# Patient Record
Sex: FEMALE | Race: WHITE | NOT HISPANIC OR LATINO | Employment: FULL TIME | URBAN - METROPOLITAN AREA
[De-identification: names, ages, dates, MRNs, and addresses within clinical notes are randomized per-mention and may not be internally consistent; named-entity substitution may affect disease eponyms.]

---

## 2019-03-06 ENCOUNTER — OFFICE VISIT (OUTPATIENT)
Dept: FAMILY MEDICINE CLINIC | Facility: CLINIC | Age: 39
End: 2019-03-06
Payer: COMMERCIAL

## 2019-03-06 VITALS
DIASTOLIC BLOOD PRESSURE: 70 MMHG | HEIGHT: 67 IN | RESPIRATION RATE: 18 BRPM | HEART RATE: 86 BPM | TEMPERATURE: 97.5 F | WEIGHT: 178.8 LBS | BODY MASS INDEX: 28.06 KG/M2 | SYSTOLIC BLOOD PRESSURE: 100 MMHG

## 2019-03-06 DIAGNOSIS — J98.01 BRONCHOSPASM: Primary | ICD-10-CM

## 2019-03-06 DIAGNOSIS — R05.9 COUGH: ICD-10-CM

## 2019-03-06 DIAGNOSIS — J40 BRONCHITIS: ICD-10-CM

## 2019-03-06 PROCEDURE — 99213 OFFICE O/P EST LOW 20 MIN: CPT | Performed by: NURSE PRACTITIONER

## 2019-03-06 PROCEDURE — 94640 AIRWAY INHALATION TREATMENT: CPT | Performed by: NURSE PRACTITIONER

## 2019-03-06 RX ORDER — AZITHROMYCIN 250 MG/1
TABLET, FILM COATED ORAL
Qty: 6 TABLET | Refills: 0 | Status: SHIPPED | OUTPATIENT
Start: 2019-03-06 | End: 2019-03-10

## 2019-03-06 RX ORDER — ALBUTEROL SULFATE 90 UG/1
2 AEROSOL, METERED RESPIRATORY (INHALATION) EVERY 4 HOURS PRN
Qty: 1 INHALER | Refills: 5 | Status: SHIPPED | OUTPATIENT
Start: 2019-03-06 | End: 2020-12-09

## 2019-03-06 RX ORDER — PREDNISONE 20 MG/1
40 TABLET ORAL DAILY
Qty: 10 TABLET | Refills: 0 | Status: SHIPPED | OUTPATIENT
Start: 2019-03-06 | End: 2020-11-27

## 2019-03-06 NOTE — PROGRESS NOTES
Assessment/Plan:    No problem-specific Assessment & Plan notes found for this encounter  Diagnoses and all orders for this visit:    Bronchospasm  -     albuterol (PROVENTIL HFA,VENTOLIN HFA) 90 mcg/act inhaler; Inhale 2 puffs every 4 (four) hours as needed for wheezing or shortness of breath  -     predniSONE 20 mg tablet; Take 2 tablets (40 mg total) by mouth daily    Bronchitis  -     azithromycin (ZITHROMAX) 250 mg tablet; Take 2 tablets today then 1 tablet daily x 4 days    Cough  Cont with otc cough med  Encourage fluids  Other orders  -     Mini neb          Subjective:      Patient ID: Sedrick Kumar is a 45 y o  female  HPI    Patient presents today for upper respiratory symptoms and fevers and chills which started about 10 days ago  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  Her children were diagnosed with the flu last week  Sounds as if she probably had similar  Right now she is with no fevers but she is proceeding to continue to cough  She is positive for wheezing  Activity causes her to cough more  She is tired run down  Feeling facial pressure    The following portions of the patient's history were reviewed and updated as appropriate: allergies, current medications, past family history, past medical history, past social history, past surgical history and problem list     Review of Systems   Constitutional: Positive for activity change, appetite change, chills and fatigue  Negative for fever  HENT: Negative for congestion, rhinorrhea, sneezing and sore throat  Respiratory: Positive for cough, chest tightness and wheezing  Cardiovascular: Negative for chest pain  Gastrointestinal: Negative for abdominal pain, constipation and diarrhea  Skin: Negative for rash  Neurological: Negative for dizziness, weakness, light-headedness, numbness and headaches  Psychiatric/Behavioral: The patient is not nervous/anxious            Objective:  Vitals:    03/06/19 1307 BP: 100/70   BP Location: Left arm   Patient Position: Sitting   Cuff Size: Adult   Pulse: 86   Resp: 18   Temp: 97 5 °F (36 4 °C)   TempSrc: Temporal   Weight: 81 1 kg (178 lb 12 8 oz)   Height: 5' 6 54" (1 69 m)      Physical Exam   Constitutional: She appears well-developed and well-nourished  HENT:   Right Ear: External ear normal    Left Ear: External ear normal    Nose: Nose normal    Mouth/Throat: Oropharynx is clear and moist    Eyes: Conjunctivae and EOM are normal    Neck: Normal range of motion  Neck supple  Cardiovascular: Normal rate, regular rhythm and normal heart sounds  Pulmonary/Chest: Effort normal and breath sounds normal    Cough with deep breath   Skin: Skin is warm and dry  Psychiatric: She has a normal mood and affect  Her behavior is normal    Vitals reviewed      Mini neb  Performed by: LAKE Koch  Authorized by: LAKE Koch     Number of treatments:  1  Treatment 1:   Pre-Procedure     Symptoms:  Cough    Lung Sounds:  Clear    HR:  86    RR:  18    Medication Administered:  Albuterol 2 5 mg  Post-Procedure     Symptoms:  Cough    Lung sounds:  Clear with slightly improved breath sounds

## 2019-09-07 ENCOUNTER — OFFICE VISIT (OUTPATIENT)
Dept: URGENT CARE | Age: 39
End: 2019-09-07
Payer: COMMERCIAL

## 2019-09-07 VITALS
SYSTOLIC BLOOD PRESSURE: 100 MMHG | OXYGEN SATURATION: 99 % | HEIGHT: 66 IN | HEART RATE: 81 BPM | TEMPERATURE: 97.9 F | BODY MASS INDEX: 31.02 KG/M2 | DIASTOLIC BLOOD PRESSURE: 65 MMHG | WEIGHT: 193 LBS

## 2019-09-07 DIAGNOSIS — D49.2 SKIN GROWTH: Primary | ICD-10-CM

## 2019-09-07 PROCEDURE — 99213 OFFICE O/P EST LOW 20 MIN: CPT | Performed by: PHYSICIAN ASSISTANT

## 2019-09-07 NOTE — PROGRESS NOTES
[facility]        NAME: Solo Leggett is a 44 y o  female  : 1980    MRN: [id number]  DATE: 2019  TIME: 12:31 PM    Assessment and Plan   Skin growth [D49 2]  1  Skin growth  Ambulatory referral to Dermatology    Ambulatory referral to General Surgery         Patient Instructions     Patient declines ER transfer at this time  She would like to follow up with PCP- will follow-up in the next 1-2 days for reexamination, further evaluation and treatment  Dermatology referral and General Surgery referral was placed, please call Dermatology and Gen Surgery to schedule an appointment in the next few days  Keep covered to prevent irritation/further abrasions or catching on clothing    Go to the nearest ER for evaluation if any fevers, redness, warmth, discharge, bleeding, pain, change in size, numbness, tingling, weakness, unable to stay hydrated, abdominal pain, chest pain, shortness of breath, new or worsening symptoms or other concerning symptoms  Chief Complaint     Chief Complaint   Patient presents with    skin tag     Pt states her skin tag located in middle gluteus was starting bleeding 3 days ago         History of Present Illness       27-year-old female presents with skin tag to the middle of her sacral area for the past few months  She states her family doctor sought a few more months ago and stated it was a skin tag  She states that has slowly been getting larger and she states 3 days ago she thinks she accidentally cut it on something/the growth got caught on something and it started to bleed  Patient states she has been trying to put paper towels/now begins to prevent the scratching/irritation on her clothes  Patient states she presented today hoping to get it removed  She states it is slightly bother some as it rubs against her clothing and when she sits  Denies any injury or trauma    Denies any fevers, drainage, redness, warmth, numbness, weakness, bowel/bladder dysfunction, GI/ symptoms, chest pain, shortness of breath or other complaints  Review of Systems   Review of Systems   Constitutional: Negative for chills, fatigue and fever  HENT: Negative for facial swelling, sore throat, trouble swallowing and voice change  Respiratory: Negative for cough and shortness of breath  Cardiovascular: Negative for chest pain  Gastrointestinal: Negative for abdominal pain, constipation, diarrhea, nausea, rectal pain and vomiting  Musculoskeletal: Negative for arthralgias, back pain, joint swelling, myalgias and neck pain  Skin: Positive for wound  Neurological: Negative for dizziness, weakness, numbness and headaches  All other systems reviewed and are negative  Current Medications       Current Outpatient Medications:     albuterol (PROVENTIL HFA,VENTOLIN HFA) 90 mcg/act inhaler, Inhale 2 puffs every 4 (four) hours as needed for wheezing or shortness of breath, Disp: 1 Inhaler, Rfl: 5    predniSONE 20 mg tablet, Take 2 tablets (40 mg total) by mouth daily (Patient not taking: Reported on 9/7/2019), Disp: 10 tablet, Rfl: 0    Current Allergies     Allergies as of 09/07/2019 - Reviewed 09/07/2019   Allergen Reaction Noted    Acetaminophen-codeine  11/03/2015    Ibuprofen  11/03/2015            The following portions of the patient's history were reviewed and updated as appropriate: allergies, current medications, past family history, past medical history, past social history, past surgical history and problem list      Past Medical History:   Diagnosis Date    Anemia 11/3/2015       No past surgical history on file  Family History   Problem Relation Age of Onset    Thyroid disease Mother     Hypertension Father     Diabetes Father          Medications have been verified          Objective   /65   Pulse 81   Temp 97 9 °F (36 6 °C)   Ht 5' 6" (1 676 m)   Wt 87 5 kg (193 lb)   LMP 08/15/2019   SpO2 99%   BMI 31 15 kg/m² Physical Exam     Physical Exam   Constitutional: She is oriented to person, place, and time  She appears well-developed and well-nourished  No distress  Nontoxic-appearing   HENT:   Mouth/Throat: Oropharynx is clear and moist    Cardiovascular: Normal rate, regular rhythm and normal heart sounds  Pulmonary/Chest: Effort normal and breath sounds normal  No respiratory distress  She has no wheezes  Neurological: She is alert and oriented to person, place, and time  She has normal reflexes  Skin: Skin is warm and dry  Rash (small linear abrasion on the skin colored 1cm circular lesion attached by a skin colored thin piece of skin coming laterally off of the skin superiolaterally from the celestino cleft) noted  No current bleeding or discharge  No sign of cyst or abscess  No surrounding erythema, warmth, streaking redness or sign of infection/cellulitis  NTTP   Psychiatric: She has a normal mood and affect  Her behavior is normal    Nursing note and vitals reviewed

## 2019-09-07 NOTE — PATIENT INSTRUCTIONS
Patient declines ER transfer at this time  She would like to follow up with PCP- will follow-up in the next 1-2 days for reexamination, further evaluation and treatment  Dermatology referral and General Surgery referral was placed, please call Dermatology and Gen Surgery to schedule an appointment in the next few days  Keep covered to prevent irritation/further abrasions or catching on clothing    Go to the nearest ER for evaluation if any fevers, redness, warmth, discharge, bleeding, pain, change in size, numbness, tingling, weakness, unable to stay hydrated, abdominal pain, chest pain, shortness of breath, new or worsening symptoms or other concerning symptoms

## 2019-09-10 RX ORDER — VALACYCLOVIR HYDROCHLORIDE 1 G/1
TABLET, FILM COATED ORAL
COMMUNITY
Start: 2017-09-05 | End: 2020-04-21 | Stop reason: SDUPTHER

## 2019-09-10 RX ORDER — ACYCLOVIR 50 MG/G
OINTMENT TOPICAL
COMMUNITY
Start: 2017-09-05 | End: 2020-04-21 | Stop reason: SDUPTHER

## 2019-09-11 ENCOUNTER — OFFICE VISIT (OUTPATIENT)
Dept: FAMILY MEDICINE CLINIC | Facility: CLINIC | Age: 39
End: 2019-09-11
Payer: COMMERCIAL

## 2019-09-11 VITALS
OXYGEN SATURATION: 98 % | BODY MASS INDEX: 30.79 KG/M2 | TEMPERATURE: 97.6 F | RESPIRATION RATE: 18 BRPM | HEART RATE: 91 BPM | DIASTOLIC BLOOD PRESSURE: 60 MMHG | SYSTOLIC BLOOD PRESSURE: 100 MMHG | WEIGHT: 191.6 LBS | HEIGHT: 66 IN

## 2019-09-11 DIAGNOSIS — L98.9 SKIN LESION OF BACK: Primary | ICD-10-CM

## 2019-09-11 PROCEDURE — 88304 TISSUE EXAM BY PATHOLOGIST: CPT | Performed by: PATHOLOGY

## 2019-09-11 PROCEDURE — 99213 OFFICE O/P EST LOW 20 MIN: CPT | Performed by: NURSE PRACTITIONER

## 2019-09-11 PROCEDURE — 3008F BODY MASS INDEX DOCD: CPT | Performed by: NURSE PRACTITIONER

## 2019-09-11 NOTE — PROGRESS NOTES
Subjective:   Chief Complaint   Patient presents with    Skin Problem     lower back         Patient ID: Valerie Brooks is a 44 y o  female  Presents today for removal of skin tag on her lower back  She thinks she scratched it or pulled it somehow in it has begun to bleed so she would like removed      The following portions of the patient's history were reviewed and updated as appropriate: allergies, current medications, past family history, past medical history, past social history, past surgical history and problem list     Review of Systems   Constitutional: Negative for chills, fatigue and fever  Respiratory: Negative for cough, shortness of breath and wheezing  Cardiovascular: Negative for chest pain and palpitations  Skin: Positive for wound (skin tag on lower back)  Psychiatric/Behavioral: Negative for dysphoric mood  The patient is not nervous/anxious  Objective:  Vitals:    09/11/19 0739   BP: 100/60   BP Location: Left arm   Patient Position: Sitting   Cuff Size: Large   Pulse: 91   Resp: 18   Temp: 97 6 °F (36 4 °C)   TempSrc: Temporal   SpO2: 98%   Weight: 86 9 kg (191 lb 9 6 oz)   Height: 5' 6" (1 676 m)      Physical Exam   Constitutional: She is oriented to person, place, and time  She appears well-developed and well-nourished  Cardiovascular: Normal rate, regular rhythm and normal heart sounds  Pulmonary/Chest: Effort normal and breath sounds normal    Neurological: She is alert and oriented to person, place, and time  Skin: Skin is warm and dry  Psychiatric: She has a normal mood and affect   Her behavior is normal            Biopsy  Date/Time: 9/11/2019 8:14 AM  Performed by: Dorris Gitelman, CRNP  Authorized by: Dorris Gitelman, CRNP     Procedure Details - Skin Biopsy:     Biopsy tissue type: skin    Body area:  Trunk    Trunk location:  Back    Initial size (mm):  2000    Final defect size (mm):  0 25    Malignancy: malignancy unknown       Lidocaine 1 % without epi 0 2ml injected and 11 blade scalpel used to removed  Pressure applied and area cauterized with silver nitrate  Site covered with mupirocin and Band-Aid  Wound Care instructions provided      Assessment/Plan:    No problem-specific Assessment & Plan notes found for this encounter         Diagnoses and all orders for this visit:    Skin lesion of back  Comments:  Removed and sent for biopsy  Orders:  -     Tissue Exam    Other orders  -     valACYclovir (VALTREX) 1,000 mg tablet; take 2 tablet by oral route  every 12 hours x 1 day  -     acyclovir (ZOVIRAX) 5 % ointment; every 3 (three) hours  -     Biopsy

## 2019-09-19 ENCOUNTER — TELEPHONE (OUTPATIENT)
Dept: FAMILY MEDICINE CLINIC | Facility: CLINIC | Age: 39
End: 2019-09-19

## 2019-09-19 NOTE — TELEPHONE ENCOUNTER
Left message for patient to call the office, per [facility]   Please call patient with pathology showing a benign lipofibroma

## 2019-12-09 ENCOUNTER — OFFICE VISIT (OUTPATIENT)
Dept: URGENT CARE | Age: 39
End: 2019-12-09
Payer: COMMERCIAL

## 2019-12-09 VITALS
OXYGEN SATURATION: 98 % | TEMPERATURE: 98 F | RESPIRATION RATE: 18 BRPM | DIASTOLIC BLOOD PRESSURE: 77 MMHG | SYSTOLIC BLOOD PRESSURE: 134 MMHG | HEART RATE: 87 BPM

## 2019-12-09 DIAGNOSIS — J01.10 ACUTE NON-RECURRENT FRONTAL SINUSITIS: Primary | ICD-10-CM

## 2019-12-09 PROCEDURE — 99213 OFFICE O/P EST LOW 20 MIN: CPT | Performed by: PHYSICIAN ASSISTANT

## 2019-12-09 RX ORDER — AMOXICILLIN AND CLAVULANATE POTASSIUM 875; 125 MG/1; MG/1
1 TABLET, FILM COATED ORAL EVERY 12 HOURS SCHEDULED
Qty: 20 TABLET | Refills: 0 | Status: SHIPPED | OUTPATIENT
Start: 2019-12-09 | End: 2019-12-19

## 2019-12-09 NOTE — PROGRESS NOTES
[facility]        NAME: Abraham Metz is a 44 y o  female  : 1980    MRN: [id number]  DATE: 2019  TIME: 1:40 PM    Assessment and Plan   Acute non-recurrent frontal sinusitis [J01 10]  1  Acute non-recurrent frontal sinusitis  amoxicillin-clavulanate (AUGMENTIN) 875-125 mg per tablet         Patient Instructions     Start antibiotics instructed  Follow up with PCP in 3-5 days  Proceed to  ER if symptoms worsen  Chief Complaint     Chief Complaint   Patient presents with    Cold Like Symptoms     x 5 days, increased phelgm, cough and head congestion    Dizziness         History of Present Illness       Patient presents with a week of sinus congestion, cough, sore throat, headaches and pressure  She has tried over-the-counter medicines without relief  She denies any fevers chills shortness of breath chest pain nausea vomiting or diarrhea      Review of Systems   Review of Systems   Constitutional: Negative  HENT: Positive for congestion, sinus pressure, sinus pain and sore throat  Respiratory: Positive for cough  Cardiovascular: Negative  Gastrointestinal: Negative  Musculoskeletal: Negative  Neurological: Positive for headaches  Psychiatric/Behavioral: Negative            Current Medications       Current Outpatient Medications:     acyclovir (ZOVIRAX) 5 % ointment, every 3 (three) hours, Disp: , Rfl:     albuterol (PROVENTIL HFA,VENTOLIN HFA) 90 mcg/act inhaler, Inhale 2 puffs every 4 (four) hours as needed for wheezing or shortness of breath (Patient not taking: Reported on 2019), Disp: 1 Inhaler, Rfl: 5    amoxicillin-clavulanate (AUGMENTIN) 875-125 mg per tablet, Take 1 tablet by mouth every 12 (twelve) hours for 10 days, Disp: 20 tablet, Rfl: 0    predniSONE 20 mg tablet, Take 2 tablets (40 mg total) by mouth daily (Patient not taking: Reported on 2019), Disp: 10 tablet, Rfl: 0    valACYclovir (VALTREX) 1,000 mg tablet, take 2 tablet by oral route  every 12 hours x 1 day, Disp: , Rfl:     Current Allergies     Allergies as of 12/09/2019    (No Known Allergies)            The following portions of the patient's history were reviewed and updated as appropriate: allergies, current medications, past family history, past medical history, past social history, past surgical history and problem list      Past Medical History:   Diagnosis Date    Anemia 11/3/2015       History reviewed  No pertinent surgical history  Family History   Problem Relation Age of Onset    Thyroid disease Mother     Hypertension Father     Diabetes Father          Medications have been verified  Objective   /77   Pulse 87   Temp 98 °F (36 7 °C)   Resp 18   LMP 11/15/2019   SpO2 98%        Physical Exam     Physical Exam   Constitutional: She is oriented to person, place, and time  She appears well-developed and well-nourished  No distress  HENT:   Head: Normocephalic and atraumatic  Right Ear: External ear normal    Left Ear: External ear normal    Nose: Nose normal    Tenderness up patient over frontal sinuses  Erythema of pharynx   Cardiovascular: Normal rate and regular rhythm  Pulmonary/Chest: Effort normal and breath sounds normal    Lymphadenopathy:     She has cervical adenopathy  Neurological: She is alert and oriented to person, place, and time  Skin: Skin is warm and dry  She is not diaphoretic  Psychiatric: She has a normal mood and affect  Her behavior is normal    Nursing note and vitals reviewed

## 2020-04-02 ENCOUNTER — TELEMEDICINE (OUTPATIENT)
Dept: FAMILY MEDICINE CLINIC | Facility: CLINIC | Age: 40
End: 2020-04-02
Payer: COMMERCIAL

## 2020-04-02 ENCOUNTER — TELEPHONE (OUTPATIENT)
Dept: FAMILY MEDICINE CLINIC | Facility: CLINIC | Age: 40
End: 2020-04-02

## 2020-04-02 DIAGNOSIS — K04.7 DENTAL ABSCESS: Primary | ICD-10-CM

## 2020-04-02 PROCEDURE — 99213 OFFICE O/P EST LOW 20 MIN: CPT | Performed by: NURSE PRACTITIONER

## 2020-04-02 RX ORDER — AMOXICILLIN 500 MG/1
CAPSULE ORAL
Qty: 28 CAPSULE | Refills: 0 | Status: SHIPPED | OUTPATIENT
Start: 2020-04-02 | End: 2020-04-08

## 2020-04-21 ENCOUNTER — TELEMEDICINE (OUTPATIENT)
Dept: FAMILY MEDICINE CLINIC | Facility: CLINIC | Age: 40
End: 2020-04-21
Payer: COMMERCIAL

## 2020-04-21 DIAGNOSIS — B00.9 HERPES SIMPLEX: Primary | ICD-10-CM

## 2020-04-21 PROCEDURE — 99214 OFFICE O/P EST MOD 30 MIN: CPT | Performed by: NURSE PRACTITIONER

## 2020-04-21 RX ORDER — VALACYCLOVIR HYDROCHLORIDE 1 G/1
TABLET, FILM COATED ORAL
Qty: 30 TABLET | Refills: 1 | Status: SHIPPED | OUTPATIENT
Start: 2020-04-21 | End: 2020-06-15

## 2020-04-21 RX ORDER — ACYCLOVIR 50 MG/G
OINTMENT TOPICAL
Qty: 15 G | Refills: 3 | Status: SHIPPED | OUTPATIENT
Start: 2020-04-21 | End: 2020-12-09

## 2020-06-15 DIAGNOSIS — B00.9 HERPES SIMPLEX: ICD-10-CM

## 2020-06-15 RX ORDER — VALACYCLOVIR HYDROCHLORIDE 1 G/1
TABLET, FILM COATED ORAL
Qty: 30 TABLET | Refills: 1 | Status: SHIPPED | OUTPATIENT
Start: 2020-06-15 | End: 2020-12-09

## 2020-07-07 ENCOUNTER — TELEPHONE (OUTPATIENT)
Dept: FAMILY MEDICINE CLINIC | Facility: CLINIC | Age: 40
End: 2020-07-07

## 2020-07-07 NOTE — TELEPHONE ENCOUNTER
[phone]  Brother is being tested today for covid 19   She was with him on Saturday at an outdoor barbecue   He has cough and headache  She has no symptoms    What is the protocol for her?

## 2020-07-07 NOTE — TELEPHONE ENCOUNTER
Advised not absolute protocol at this time   She has not symptoms   She is ok to wait for either his test or developing symptoms   Will let us know if work has issue

## 2020-11-27 ENCOUNTER — TREATMENT (OUTPATIENT)
Dept: FAMILY MEDICINE CLINIC | Facility: CLINIC | Age: 40
End: 2020-11-27
Payer: COMMERCIAL

## 2020-11-27 ENCOUNTER — TELEPHONE (OUTPATIENT)
Dept: FAMILY MEDICINE CLINIC | Facility: CLINIC | Age: 40
End: 2020-11-27

## 2020-11-27 PROCEDURE — 99213 OFFICE O/P EST LOW 20 MIN: CPT | Performed by: FAMILY MEDICINE

## 2020-11-30 ENCOUNTER — TREATMENT (OUTPATIENT)
Dept: FAMILY MEDICINE CLINIC | Facility: CLINIC | Age: 40
End: 2020-11-30
Payer: COMMERCIAL

## 2020-11-30 ENCOUNTER — TELEPHONE (OUTPATIENT)
Dept: FAMILY MEDICINE CLINIC | Facility: CLINIC | Age: 40
End: 2020-11-30

## 2020-11-30 PROCEDURE — 1036F TOBACCO NON-USER: CPT | Performed by: FAMILY MEDICINE

## 2020-11-30 PROCEDURE — 99213 OFFICE O/P EST LOW 20 MIN: CPT | Performed by: FAMILY MEDICINE

## 2020-12-02 ENCOUNTER — TREATMENT (OUTPATIENT)
Dept: FAMILY MEDICINE CLINIC | Facility: CLINIC | Age: 40
End: 2020-12-02

## 2020-12-09 ENCOUNTER — HOSPITAL ENCOUNTER (EMERGENCY)
Facility: HOSPITAL | Age: 40
Discharge: HOME | End: 2020-12-09
Attending: EMERGENCY MEDICINE
Payer: COMMERCIAL

## 2020-12-09 ENCOUNTER — TELEPHONE (OUTPATIENT)
Dept: FAMILY MEDICINE CLINIC | Facility: CLINIC | Age: 40
End: 2020-12-09

## 2020-12-09 ENCOUNTER — APPOINTMENT (EMERGENCY)
Dept: RADIOLOGY | Facility: HOSPITAL | Age: 40
End: 2020-12-09
Payer: COMMERCIAL

## 2020-12-09 VITALS
HEART RATE: 72 BPM | RESPIRATION RATE: 20 BRPM | BODY MASS INDEX: 28.93 KG/M2 | TEMPERATURE: 98.3 F | SYSTOLIC BLOOD PRESSURE: 127 MMHG | DIASTOLIC BLOOD PRESSURE: 69 MMHG | OXYGEN SATURATION: 99 % | WEIGHT: 180 LBS | HEIGHT: 66 IN

## 2020-12-09 DIAGNOSIS — U07.1 COVID-19: ICD-10-CM

## 2020-12-09 DIAGNOSIS — R06.00 DYSPNEA: Primary | ICD-10-CM

## 2020-12-09 LAB
ATRIAL RATE: 71 BPM
P AXIS: 73 DEGREES
PR INTERVAL: 146 MS
QRS AXIS: 88 DEGREES
QRSD INTERVAL: 78 MS
QT INTERVAL: 374 MS
QTC INTERVAL: 406 MS
T WAVE AXIS: 64 DEGREES
VENTRICULAR RATE: 71 BPM

## 2020-12-09 PROCEDURE — 93005 ELECTROCARDIOGRAM TRACING: CPT

## 2020-12-09 PROCEDURE — 99285 EMERGENCY DEPT VISIT HI MDM: CPT

## 2020-12-09 PROCEDURE — 71045 X-RAY EXAM CHEST 1 VIEW: CPT

## 2020-12-09 PROCEDURE — 93010 ELECTROCARDIOGRAM REPORT: CPT | Performed by: INTERNAL MEDICINE

## 2020-12-09 PROCEDURE — 99284 EMERGENCY DEPT VISIT MOD MDM: CPT | Performed by: EMERGENCY MEDICINE

## 2020-12-09 RX ORDER — ALBUTEROL SULFATE 90 UG/1
2 AEROSOL, METERED RESPIRATORY (INHALATION) ONCE
Status: COMPLETED | OUTPATIENT
Start: 2020-12-09 | End: 2020-12-09

## 2020-12-09 RX ADMIN — ALBUTEROL SULFATE 2 PUFF: 90 AEROSOL, METERED RESPIRATORY (INHALATION) at 12:50

## 2021-04-13 DIAGNOSIS — Z23 ENCOUNTER FOR IMMUNIZATION: ICD-10-CM

## 2022-02-11 ENCOUNTER — TELEPHONE (OUTPATIENT)
Dept: FAMILY MEDICINE CLINIC | Facility: CLINIC | Age: 42
End: 2022-02-11

## 2022-02-11 DIAGNOSIS — R92.8 ABNORMAL MAMMOGRAM: Primary | ICD-10-CM

## 2022-02-11 NOTE — TELEPHONE ENCOUNTER
[facility] calling for order for  diagnostic mammogram right breast  Patient scheduled for Monday 2/14/2022  Faxed to [phone]

## 2022-02-11 NOTE — PROGRESS NOTES
[facility] calling for order for right diagnotic mammogram  Patient schedule for Monday 2/14/2022  Had abnormal mammogram    Order done and faxed to [phone]

## 2022-03-07 ENCOUNTER — TELEMEDICINE (OUTPATIENT)
Dept: FAMILY MEDICINE CLINIC | Facility: CLINIC | Age: 42
End: 2022-03-07
Payer: COMMERCIAL

## 2022-03-07 VITALS — TEMPERATURE: 97.8 F

## 2022-03-07 DIAGNOSIS — J06.9 VIRAL UPPER RESPIRATORY TRACT INFECTION: Primary | ICD-10-CM

## 2022-03-07 PROCEDURE — 1036F TOBACCO NON-USER: CPT | Performed by: FAMILY MEDICINE

## 2022-03-07 PROCEDURE — 87651 STREP A DNA AMP PROBE: CPT | Performed by: FAMILY MEDICINE

## 2022-03-07 PROCEDURE — U0003 INFECTIOUS AGENT DETECTION BY NUCLEIC ACID (DNA OR RNA); SEVERE ACUTE RESPIRATORY SYNDROME CORONAVIRUS 2 (SARS-COV-2) (CORONAVIRUS DISEASE [COVID-19]), AMPLIFIED PROBE TECHNIQUE, MAKING USE OF HIGH THROUGHPUT TECHNOLOGIES AS DESCRIBED BY CMS-2020-01-R: HCPCS | Performed by: FAMILY MEDICINE

## 2022-03-07 PROCEDURE — U0005 INFEC AGEN DETEC AMPLI PROBE: HCPCS | Performed by: FAMILY MEDICINE

## 2022-03-07 PROCEDURE — 99213 OFFICE O/P EST LOW 20 MIN: CPT | Performed by: FAMILY MEDICINE

## 2022-03-07 NOTE — PROGRESS NOTES
Virtual Regular Visit    Verification of patient location:    Patient is located in the following state in which I hold an active license [place]      Assessment/Plan:    Problem List Items Addressed This Visit        Respiratory    Viral upper respiratory tract infection - Primary     Unchanged  · Symptom onset 03/04/2022  · Patient has COVID-19 vaccine with booster  · Had COVID infection in 11/2020  · Encouraged rest, hydration, sinus spray, Tylenol, and mucinex  · Followup COVID-19 test and strep swab  · Encourage patient remains of 14 until results return  · Discussed ED precautions  · Follow-up as needed         Relevant Orders    COVID Only - Office Collect    Strep A PCR               Reason for visit is   Chief Complaint   Patient presents with    Cough    Earache    Headache    Virtual Regular Visit        Encounter provider [address] [facility] [address], DO    Provider located at [facility] AT [place] [address] [address]      Recent Visits  No visits were found meeting these conditions  Showing recent visits within past 7 days and meeting all other requirements  Today's Visits  Date Type Provider Dept   03/07/22 Telemedicine Robson Martin DO [facility]  [facility] [address]    Showing today's visits and meeting all other requirements  Future Appointments  No visits were found meeting these conditions  Showing future appointments within next 150 days and meeting all other requirements       The patient was identified by name and date of birth  Mercedes George was informed that this is a telemedicine visit and that the visit is being conducted through [address] [place] [address] Now and patient was informed that this is a secure, HIPAA-compliant platform  She agrees to proceed     My office door was closed  No one else was in the room  She acknowledged consent and understanding of privacy and security of the video platform   The patient has agreed to participate and understands they can discontinue the visit at any time  Patient is aware this is a billable service  Subjective  Aubrie Maki is a 39 y o  female presents today for sick visit  Symptoms:Cough, rhinorrhea, sore throat, headache, sinus   Symptom onset: 3/4/2022  Medications tried: mucinex sinus max, and tylenol  COVID vaccine: yes  covid infection 11/2020  Sick contacts: daughter was sick    HPI     Past Medical History:   Diagnosis Date    Anemia 11/3/2015       No past surgical history on file  No current outpatient medications on file  No current facility-administered medications for this visit  No Known Allergies    Review of Systems   Constitutional: Negative for chills, diaphoresis, fatigue, fever and unexpected weight change  HENT: Positive for congestion, postnasal drip, rhinorrhea, sinus pressure, sinus pain and sore throat  Respiratory: Positive for cough and shortness of breath  Cardiovascular: Negative for chest pain, palpitations and leg swelling  Gastrointestinal: Negative for abdominal pain, diarrhea, nausea and vomiting  Video Exam    Vitals:    03/07/22 1254   Temp: 97 8 °F (36 6 °C)   TempSrc: Temporal       Physical Exam  Vitals reviewed  Constitutional:       General: She is not in acute distress  Appearance: She is not ill-appearing or toxic-appearing  HENT:      Head: Normocephalic and atraumatic  Right Ear: External ear normal       Left Ear: External ear normal       Nose: Congestion present  Mouth/Throat:      Mouth: Mucous membranes are moist       Pharynx: Oropharynx is clear  Eyes:      General: No scleral icterus  Right eye: No discharge  Left eye: No discharge  Conjunctiva/sclera: Conjunctivae normal    Pulmonary:      Effort: No respiratory distress  Skin:     Coloration: Skin is not pale  Findings: No erythema  Neurological:      Mental Status: She is alert   Mental status is at baseline  Psychiatric:         Mood and Affect: Mood normal          Thought Content: Thought content normal           I spent 15 minutes directly with the patient during this visit    VIRTUAL VISIT DISCLAIMER      Shivam Pereira verbally agrees to participate in [employer]  Pt is aware that [employer] could be limited without vital signs or the ability to perform a full hands-on physical Sheridansukh Gomez understands she or the provider may request at any time to terminate the video visit and request the patient to seek care or treatment in person

## 2022-03-07 NOTE — ASSESSMENT & PLAN NOTE
Unchanged  · Symptom onset 03/04/2022  · Patient has COVID-19 vaccine with booster  · Had COVID infection in 11/2020  · Encouraged rest, hydration, sinus spray, Tylenol, and mucinex  · Followup COVID-19 test and strep swab  · Encourage patient remains of 14 until results return  · Discussed ED precautions  · Follow-up as needed

## 2022-03-08 LAB
S PYO DNA THROAT QL NAA+PROBE: NOT DETECTED
SARS-COV-2 RNA RESP QL NAA+PROBE: NEGATIVE

## 2022-10-11 PROBLEM — J06.9 VIRAL UPPER RESPIRATORY TRACT INFECTION: Status: RESOLVED | Noted: 2022-03-07 | Resolved: 2022-10-11

## 2023-05-31 ENCOUNTER — AMB VIDEO VISIT (OUTPATIENT)
Dept: OTHER | Facility: HOSPITAL | Age: 43
End: 2023-05-31

## 2023-05-31 DIAGNOSIS — B00.9 HERPES SIMPLEX: Primary | ICD-10-CM

## 2023-05-31 PROBLEM — B02.9 SHINGLES: Status: RESOLVED | Noted: 2023-05-31 | Resolved: 2023-05-31

## 2023-05-31 PROBLEM — B02.9 SHINGLES: Status: ACTIVE | Noted: 2023-05-31

## 2023-05-31 RX ORDER — VALACYCLOVIR HYDROCHLORIDE 1 G/1
1000 TABLET, FILM COATED ORAL 3 TIMES DAILY
Qty: 21 TABLET | Refills: 1 | Status: SHIPPED | OUTPATIENT
Start: 2023-05-31 | End: 2023-06-07

## 2023-05-31 NOTE — PATIENT INSTRUCTIONS
Dr Cheryl Mack  [phone]    Oral Herpes Infection   AMBULATORY CARE:   Oral herpes  is a sexually transmitted infection (STI) caused by the herpes simplex virus (HSV)  HSV has 2 types  An oral HSV infection is usually caused by HSV type 1  HSV type 2 usually affects the genital area but may also affect the mouth  Signs and symptoms of an oral HSV infection:  You may not have any signs or symptoms  Signs and symptoms that do develop may appear suddenly and last 5 days to 2 weeks  Blisters may form on your mouth, lips, or gums  The blisters may burst or join together to form large open sores  Sores on your lips or face will then dry up (crust over)  You may also have any of the following:  Burning, tingling, itching, or pain at the affected area before sores form    Fever, chills, or a headache    A sore throat or swollen lymph nodes in your neck    More tired and irritated than normal for you    Trouble eating, drinking, or speaking because of your mouth pain    Red, swollen, and bleeding gums    Seek care immediately if:   You see fluid that is not clear coming from the sores  You have changes in your vision or sudden eye pain  You have eye pain when you look into bright lights  You have sores on your eyes  You have abdominal pain, a severe headache, or confusion  Call your doctor if:   You see pus coming out of your sores  Your symptoms get worse or have not gone away within 14 days  You have trouble eating, drinking, or talking because of your mouth pain  You are nauseated, or you vomit  Your eyes feel irritated, or you feel like you have something in your eye  You have questions or concerns about your condition or care  Treatment:  An HSV infection cannot be cured  The blisters usually heal on their own within 10 days  Blisters may go away and come back several times  An oral HSV infection that comes back is also known as a cold sore   You may need any of the following:  Antiviral medicine  helps relieve symptoms and shortens the amount of time you have blisters or sores  You may also need to take antiviral medicine daily to prevent outbreaks  Pain medicine  may be recommended if you have trouble eating or drinking because of the pain  The medicine may be given as a mouth rinse  Use it as directed by your healthcare provider  Manage your symptoms:   Eat soft, plain foods until your sores heal   Foods such as eggs, yogurt, soup, rice, and pasta may be easier for you to eat  Do not eat crunchy, dry, salty, or spicy foods such as dry toast, popcorn, or chips  Do not have foods or drinks that contain citric acid, such as grapefruit or orange juice  Drink cool liquids to help decrease mouth pain  Do not have carbonated liquids, such as soft drinks or sparkling water  A straw may help you drink more easily if you have blisters on your lips or tongue  Use ice to help reduce swelling or pain  Use an ice pack, or put crushed ice in a plastic bag  Cover the bag with a towel before you place it on your lip  Apply ice for 15 to 20 minutes every hour, or as directed  Prevent the spread of HSV:   Avoid close contact with others until your blisters or sores heal   Close contact includes touching, kissing, and oral sex  Do not share items with anyone  Examples include eating utensils, towels, and lip balm  Do not touch your sores, blisters, or scabs  The virus may spread from your fingers  Wash your hands often  Use soap and water  Use germ-killing gel if soap and water are not available  Follow up with your doctor as directed:  Write down your questions so you remember to ask them during your visits  © [facility] 2022 Information is for End User's use only and may not be sold, redistributed or otherwise used for commercial purposes  The above information is an  only   It is not intended as medical advice for individual conditions or treatments  Talk to your doctor, nurse or pharmacist before following any medical regimen to see if it is safe and effective for you

## 2023-05-31 NOTE — PROGRESS NOTES
Video Visit - Magda Malone 37 y o  female MRN: [id number]    REQUIRED DOCUMENTATION:         1  [facility]  2  Provider located at [address] [place] [id number] [phone]  3  [facility] provider: Margareth Sanat PA-C   4  Identify all parties in room with patient during [facility] visit:  significant other-permission granted and child(katelyn)- permission granted  5  After connecting through [employer], patient was identified by name and date of birth  Patient was then informed that this was a Telemedicine visit and that the exam was being conducted confidentially over secure lines  My office door was closed  No one else was in the room  Patient acknowledged consent and understanding of privacy and security of the Telemedicine visit  I informed the patient that I have reviewed their record in Epic and presented the opportunity for them to ask any questions regarding the visit today  The patient agreed to participate  VITALS: Heart Rate: 72 BPM, Respiratory Rate: 12 RPM, Temperature Unavailable° F, Blood Pressure Unavailable mmHg, Pulse Ox Unavailable % on RA    HPI  Patient reports in April she had shingles  Was seen virtually at that time  Was started on oral and topical antivirals and resolved completely  Has noticed when she is stressed she gets reoccurrence since age 22  Started with rash and burning and itching today  No fevers joint pain fatigue  No hx cold sores  Physical Exam  Constitutional:       General: She is not in acute distress  Appearance: Normal appearance  She is not toxic-appearing  HENT:      Head: Normocephalic and atraumatic  Nose: No rhinorrhea  Mouth/Throat:      Mouth: Mucous membranes are moist      Eyes:      Conjunctiva/sclera: Conjunctivae normal    Pulmonary:      Effort: Pulmonary effort is normal  No respiratory distress  Breath sounds:  No wheezing (no gross audible wheeze through computer)  Musculoskeletal:      Cervical back: Normal range of motion  Skin:     Findings: No rash (on face or neck)  Neurological:      Mental Status: She is alert  Cranial Nerves: No dysarthria or facial asymmetry  Psychiatric:         Mood and Affect: Mood normal          Behavior: Behavior normal          Diagnoses and all orders for this visit:    Herpes simplex  -     valACYclovir (VALTREX) 1,000 mg tablet; Take 1 tablet (1,000 mg total) by mouth 3 (three) times a day for 7 days      Patient Instructions     Dr Jun Heredia  [phone]    Oral Herpes Infection   AMBULATORY CARE:   Oral herpes  is a sexually transmitted infection (STI) caused by the herpes simplex virus (HSV)  HSV has 2 types  An oral HSV infection is usually caused by HSV type 1  HSV type 2 usually affects the genital area but may also affect the mouth  Signs and symptoms of an oral HSV infection:  You may not have any signs or symptoms  Signs and symptoms that do develop may appear suddenly and last 5 days to 2 weeks  Blisters may form on your mouth, lips, or gums  The blisters may burst or join together to form large open sores  Sores on your lips or face will then dry up (crust over)  You may also have any of the following:  • Burning, tingling, itching, or pain at the affected area before sores form    • Fever, chills, or a headache    • A sore throat or swollen lymph nodes in your neck    • More tired and irritated than normal for you    • Trouble eating, drinking, or speaking because of your mouth pain    • Red, swollen, and bleeding gums    Seek care immediately if:   • You see fluid that is not clear coming from the sores  • You have changes in your vision or sudden eye pain  • You have eye pain when you look into bright lights  • You have sores on your eyes  • You have abdominal pain, a severe headache, or confusion  Call your doctor if:   • You see pus coming out of your sores      • Your symptoms get worse or have not gone away within 14 days  • You have trouble eating, drinking, or talking because of your mouth pain  • You are nauseated, or you vomit  • Your eyes feel irritated, or you feel like you have something in your eye  • You have questions or concerns about your condition or care  Treatment:  An HSV infection cannot be cured  The blisters usually heal on their own within 10 days  Blisters may go away and come back several times  An oral HSV infection that comes back is also known as a cold sore  You may need any of the following:  • Antiviral medicine  helps relieve symptoms and shortens the amount of time you have blisters or sores  You may also need to take antiviral medicine daily to prevent outbreaks  • Pain medicine  may be recommended if you have trouble eating or drinking because of the pain  The medicine may be given as a mouth rinse  Use it as directed by your healthcare provider  Manage your symptoms:   • Eat soft, plain foods until your sores heal   Foods such as eggs, yogurt, soup, rice, and pasta may be easier for you to eat  Do not eat crunchy, dry, salty, or spicy foods such as dry toast, popcorn, or chips  Do not have foods or drinks that contain citric acid, such as grapefruit or orange juice  • Drink cool liquids to help decrease mouth pain  Do not have carbonated liquids, such as soft drinks or sparkling water  A straw may help you drink more easily if you have blisters on your lips or tongue  • Use ice to help reduce swelling or pain  Use an ice pack, or put crushed ice in a plastic bag  Cover the bag with a towel before you place it on your lip  Apply ice for 15 to 20 minutes every hour, or as directed  Prevent the spread of HSV:   • Avoid close contact with others until your blisters or sores heal   Close contact includes touching, kissing, and oral sex  • Do not share items with anyone    Examples include eating utensils, towels, and lip balm     • Do not touch your sores, blisters, or scabs  The virus may spread from your fingers  • Wash your hands often  Use soap and water  Use germ-killing gel if soap and water are not available  Follow up with your doctor as directed:  Write down your questions so you remember to ask them during your visits  © Copyright [employer] 2022 Information is for End User's use only and may not be sold, redistributed or otherwise used for commercial purposes  The above information is an  only  It is not intended as medical advice for individual conditions or treatments  Talk to your doctor, nurse or pharmacist before following any medical regimen to see if it is safe and effective for you

## 2023-09-29 ENCOUNTER — OFFICE VISIT (OUTPATIENT)
Dept: GYNECOLOGY | Facility: CLINIC | Age: 43
End: 2023-09-29
Payer: COMMERCIAL

## 2023-09-29 VITALS
HEIGHT: 66 IN | BODY MASS INDEX: 30.37 KG/M2 | DIASTOLIC BLOOD PRESSURE: 74 MMHG | WEIGHT: 189 LBS | SYSTOLIC BLOOD PRESSURE: 118 MMHG

## 2023-09-29 DIAGNOSIS — Z01.419 WOMEN'S ANNUAL ROUTINE GYNECOLOGICAL EXAMINATION: Primary | ICD-10-CM

## 2023-09-29 DIAGNOSIS — Z11.51 SCREENING FOR HUMAN PAPILLOMAVIRUS (HPV): ICD-10-CM

## 2023-09-29 DIAGNOSIS — Z12.31 ENCOUNTER FOR SCREENING MAMMOGRAM FOR BREAST CANCER: ICD-10-CM

## 2023-09-29 PROCEDURE — S0610 ANNUAL GYNECOLOGICAL EXAMINA: HCPCS | Performed by: OBSTETRICS & GYNECOLOGY

## 2023-09-29 PROCEDURE — G0145 SCR C/V CYTO,THINLAYER,RESCR: HCPCS | Performed by: OBSTETRICS & GYNECOLOGY

## 2023-09-29 PROCEDURE — G0476 HPV COMBO ASSAY CA SCREEN: HCPCS | Performed by: OBSTETRICS & GYNECOLOGY

## 2023-09-29 NOTE — PROGRESS NOTES
Assessment/Plan   Diagnoses and all orders for this visit:    Women's annual routine gynecological examination    Encounter for screening mammogram for breast cancer  -     Mammo screening bilateral w 3d & cad; Future    1. yearly exam- Pap smear done with HPV testing, self breast awareness reviewed, calcium/vitamin D recommendations discussed, mammogram request given  2. History of heavy menses- patient has been exercising and eating healthy with 12 pound weight loss over the past few months with significant improvement of her menstrual cycles. Menses are 28 to 30-day intervals lasting 5 days with the first 2 days requiring pad change every 2-3 hours. She will call with any menometrorrhagia. She did have recent blood work with life insurance with elevated triglycerides but other findings are normal.  She does have beta Thal, takes iron once daily. 3. history of beta [id number] as per treating doctor. 4. contraception-tubal ligation, status post  x3 with tubal.  5. breast/dense breast-had mammogram 2022 with 3D mammogram with right asymmetry. Follow-up diagnostic imaging on the right was okay with yearly mammogram recommended. Request was given. Patient does have dense breasts, counseled about limited visualization and possible increased risk related to that. To continue with 3D mammograms, could consider ABUS going forward. 6. history of twin delivery- done with her third . Children are ages 25, 15, and twins are 15. Follow-up 1 year for yearly exam or as needed. Subjective   Patient ID: Amy Alford is a 37 y.o. female. Vitals:    23 1051   BP: 118/74     Seen today for new patient yearly exam.  She was last seen in . Please see assessment plan for details.       The following portions of the patient's history were reviewed and updated as appropriate: allergies, current medications, past family history, past medical history, past social history, past surgical history and problem list.  Past Medical History:   Diagnosis Date   • Anemia 11/3/2015   • Shingles 2023     History reviewed. No pertinent surgical history. OB History    Para Term  AB Living   6 4 3 1 2 4   SAB IAB Ectopic Multiple Live Births   2     1        # Outcome Date GA Lbr Kd/2nd Weight Sex Delivery Anes PTL Lv   6 SAB            5 SAB            4 Term            3 Term            2 Term            1                 Current Outpatient Medications:   •  valACYclovir (VALTREX) 1,000 mg tablet, Take 1 tablet (1,000 mg total) by mouth 3 (three) times a day for 7 days, Disp: 21 tablet, Rfl: 1  No Known Allergies  Social History     Socioeconomic History   • Marital status: /Civil Union     Spouse name: None   • Number of children: 4   • Years of education: None   • Highest education level: None   Occupational History   • None   Tobacco Use   • Smoking status: Never   • Smokeless tobacco: Never   Vaping Use   • Vaping Use: Never used   Substance and Sexual Activity   • Alcohol use: Yes     Comment: socially   • Drug use: Never   • Sexual activity: Yes     Partners: Male   Other Topics Concern   • None   Social History Narrative   • None     Social Determinants of Health     Financial Resource Strain: Low Risk  (3/6/2019)    Overall Financial Resource Strain (CARDIA)    • Difficulty of Paying Living Expenses: Not hard at all   Food Insecurity: No Food Insecurity (3/6/2019)    Hunger Vital Sign    • Worried About Running Out of Food in the Last Year: Never true    • Ran Out of Food in the Last Year: Never true   Transportation Needs: No Transportation Needs (3/6/2019)    PRAPARE - Transportation    • Lack of Transportation (Medical): No    • Lack of Transportation (Non-Medical):  No   Physical Activity: Sufficiently Active (3/6/2019)    Exercise Vital Sign    • Days of Exercise per Week: 4 days    • Minutes of Exercise per Session: 40 min   Stress: No Stress Concern Present (3/6/2019)    [facility]    • Feeling of Stress : Not at all   Social Connections: Moderately Isolated (3/6/2019)    Social Connection and Isolation Panel [NHANES]    • Frequency of Communication with Friends and Family: More than three times a week    • Frequency of Social Gatherings with Friends and Family: More than three times a week    • Attends Buddhist Services: Never    • Active Member of Clubs or Organizations: No    • Attends Club or Organization Meetings: Never    • Marital Status:    Intimate Partner Violence: Not At Risk (3/6/2019)    Humiliation, Afraid, Rape, and Kick questionnaire    • Fear of Current or Ex-Partner: No    • Emotionally Abused: No    • Physically Abused: No    • Sexually Abused: No   Housing Stability: Not on file     Family History   Problem Relation Age of Onset   • Osteoporosis Mother    • Thyroid disease Mother    • Heart disease Father    • Hypertension Father    • Diabetes Father    • No Known Problems Sister    • Thyroid disease Brother    • Hypertension Maternal Grandmother    • Hypertension Maternal Grandfather    • Heart disease Paternal Grandmother    • Hypertension Paternal Grandmother    • Heart disease Paternal Grandfather    • Hypertension Paternal Grandfather        Review of Systems   Constitutional: Negative for chills, diaphoresis, fatigue and fever. Respiratory: Negative for apnea, cough, chest tightness, shortness of breath and wheezing. Cardiovascular: Negative for chest pain, palpitations and leg swelling. Gastrointestinal: Negative for abdominal distention, abdominal pain, anal bleeding, constipation, diarrhea, nausea, rectal pain and vomiting. Genitourinary: Negative for difficulty urinating, dyspareunia, dysuria, frequency, hematuria, menstrual problem, pelvic pain, urgency, vaginal bleeding, vaginal discharge and vaginal pain.    Musculoskeletal: Negative for arthralgias, back pain and myalgias. Skin: Negative for color change and rash. Neurological: Negative for dizziness, syncope, light-headedness, numbness and headaches. Hematological: Negative for adenopathy. Does not bruise/bleed easily. Psychiatric/Behavioral: Negative for dysphoric mood and sleep disturbance. The patient is not nervous/anxious. Objective   Physical Exam  OBGyn Exam     Objective      /74 (BP Location: Right arm, Patient Position: Sitting)   Ht 5' 5.75" (1.67 m)   Wt 85.7 kg (189 lb)   LMP 09/10/2023   BMI 30.74 kg/m²     General:   alert and oriented, in no acute distress   Neck: normal to inspection and palpation   Breast: normal appearance, no masses or tenderness   Heart:    Lungs:    Abdomen: soft, non-tender, without masses or organomegaly   Vulva: normal   Vagina: Without erythema or lesions or discharge. Normal   Cervix: Without lesions or discharge or cervicitis. No Cervical motion tenderness.   Light bleeding with Pap test   Uterus: top normal size, anteverted, non-tender   Adnexa: no mass, fullness, tenderness   Rectum: negative    Psych:  Normal mood and affect   Skin:  Without obvious lesions   Eyes: symmetric, with normal movements and reactivity   Musculoskeletal:  Normal muscle tone and movements appreciated

## 2023-10-02 LAB
HPV HR 12 DNA CVX QL NAA+PROBE: NEGATIVE
HPV16 DNA CVX QL NAA+PROBE: NEGATIVE
HPV18 DNA CVX QL NAA+PROBE: NEGATIVE

## 2023-10-10 LAB
LAB AP GYN PRIMARY INTERPRETATION: NORMAL
LAB AP LMP: NORMAL
Lab: NORMAL

## 2024-10-01 ENCOUNTER — ANNUAL EXAM (OUTPATIENT)
Dept: GYNECOLOGY | Facility: CLINIC | Age: 44
End: 2024-10-01
Payer: COMMERCIAL

## 2024-10-01 VITALS
WEIGHT: 192.4 LBS | BODY MASS INDEX: 30.92 KG/M2 | SYSTOLIC BLOOD PRESSURE: 118 MMHG | HEIGHT: 66 IN | DIASTOLIC BLOOD PRESSURE: 76 MMHG

## 2024-10-01 DIAGNOSIS — Z01.419 WOMEN'S ANNUAL ROUTINE GYNECOLOGICAL EXAMINATION: Primary | ICD-10-CM

## 2024-10-01 DIAGNOSIS — Z12.31 ENCOUNTER FOR SCREENING MAMMOGRAM FOR BREAST CANCER: ICD-10-CM

## 2024-10-01 PROCEDURE — S0612 ANNUAL GYNECOLOGICAL EXAMINA: HCPCS | Performed by: OBSTETRICS & GYNECOLOGY

## 2024-10-01 NOTE — PROGRESS NOTES
Assessment & Plan   Diagnoses and all orders for this visit:    Women's annual routine gynecological examination    Encounter for screening mammogram for breast cancer  -     Mammo screening bilateral w 3d and cad; Future    1. yearly exam-Pap smear deferred, self breast awareness reviewed, calcium/vitamin D recommendations discussed, mammogram request given colonoscopy recommended age 45.  2. history of heavy menses-improved.  As spotting for 2 days, 2 days of heavy bleeding changing pad and tampon at about 2-hour intervals and then 1 or 2 days of light bleeding after that.  This has improved with healthier diet and exercise.  She does take ibuprofen which has helped as well.  She will call or return with any issues.  3. history of beta [id number] as per treating doctor  4. contraception-tubal ligation, status post  x 3 with tubal ligation with last surgery.  5. dense breast-mammogram continues with dense changes, last from 2024.  Tyrer-Cuzick risk was 8%.  Counseled about limited visualization and possible increased risk related to dense changes.  She was counseled about ABUS, the ABUS information sheet was given.  She will get mammogram done 2025 and if still dense, she will consider ABUS going forward.  Counseled about need for checking for insurance company coverage for that procedure.  6. history of twin delivery-noted with third .  7.  Other-paternal aunt was diagnosed with colon cancer with metastasis.  Support was given.  Recommend colonoscopy at age 45.  Paternal great aunt had breast cancer.  8.  Perimenopause-does note some night sweats and hot flushes.  Denies severe symptoms.  Normal cyclicity to menses still noted.  Practical recommendations were reviewed.  Information was given.  Follow-up 1 year for yearly exam or as needed.    Subjective   Patient ID: Butch Tripp is a 44 y.o. female.    Vitals:    10/01/24 1352   BP: 118/76     Was seen today for  yearly exam.  Please see assessment plan for details.        The following portions of the patient's history were reviewed and updated as appropriate: allergies, current medications, past family history, past medical history, past social history, past surgical history, and problem list.  Past Medical History:   Diagnosis Date    Anemia 11/3/2015    Shingles 2023     History reviewed. No pertinent surgical history.  OB History    Para Term  AB Living   5 3 2 1 2 4   SAB IAB Ectopic Multiple Live Births   2     1 3      # Outcome Date GA Lbr Kd/2nd Weight Sex Type Anes PTL Lv   5   36w0d    CS-Unspec   SARAH   4 Term      CS-Unspec   SARAH      Complications: Failed Induction   3 Term      CS-Unspec   SARAH   2 SAB            1 SAB                Current Outpatient Medications:     valACYclovir (VALTREX) 1,000 mg tablet, Take 1 tablet (1,000 mg total) by mouth 3 (three) times a day for 7 days, Disp: 21 tablet, Rfl: 1  No Known Allergies  Social History     Socioeconomic History    Marital status: /Civil Union     Spouse name: None    Number of children: 4    Years of education: None    Highest education level: None   Occupational History    None   Tobacco Use    Smoking status: Never    Smokeless tobacco: Never   Vaping Use    Vaping status: Never Used   Substance and Sexual Activity    Alcohol use: Yes     Comment: socially    Drug use: Never    Sexual activity: Yes     Partners: Male   Other Topics Concern    None   Social History Narrative    None     Social Determinants of Health     Financial Resource Strain: Low Risk  (3/6/2019)    Overall Financial Resource Strain (CARDIA)     Difficulty of Paying Living Expenses: Not hard at all   Food Insecurity: No Food Insecurity (3/6/2019)    Hunger Vital Sign     Worried About Running Out of Food in the Last Year: Never true     Ran Out of Food in the Last Year: Never true   Transportation Needs: No Transportation Needs (3/6/2019)     PRAPARE - Transportation     Lack of Transportation (Medical): No     Lack of Transportation (Non-Medical): No   Physical Activity: Sufficiently Active (3/6/2019)    Exercise Vital Sign     Days of Exercise per Week: 4 days     Minutes of Exercise per Session: 40 min   Stress: No Stress Concern Present (3/6/2019)    [employer] [place] [employer] - Occupational Stress Questionnaire     Feeling of Stress : Not at all   Social Connections: Moderately Isolated (3/6/2019)    Social Connection and Isolation Panel [NHANES]     Frequency of Communication with Friends and Family: More than three times a week     Frequency of Social Gatherings with Friends and Family: More than three times a week     Attends [employer] Services: Never     Active Member of Clubs or Organizations: No     Attends Club or Organization Meetings: Never     Marital Status:    Intimate Partner Violence: Not At Risk (3/6/2019)    Humiliation, Afraid, Rape, and Kick questionnaire     Fear of Current or Ex-Partner: No     Emotionally Abused: No     Physically Abused: No     Sexually Abused: No   Housing Stability: Not on file     Family History   Problem Relation Age of Onset    Osteoporosis Mother     Thyroid disease Mother     Heart disease Father     Hypertension Father     Diabetes Father     No Known Problems Sister     Thyroid disease Brother     Hypertension Maternal Grandmother     Hypertension Maternal Grandfather     Heart disease Paternal Grandmother     Hypertension Paternal Grandmother     Heart disease Paternal Grandfather     Hypertension Paternal Grandfather        Review of Systems   Constitutional:  Negative for chills, diaphoresis, fatigue and fever.   Respiratory:  Negative for apnea, cough, chest tightness, shortness of breath and wheezing.    Cardiovascular:  Negative for chest pain, palpitations and leg swelling.   Gastrointestinal:  Negative for abdominal distention, abdominal pain, anal bleeding,  "constipation, diarrhea, nausea, rectal pain and vomiting.   Genitourinary:  Negative for difficulty urinating, dyspareunia, dysuria, frequency, hematuria, menstrual problem, pelvic pain, urgency, vaginal bleeding, vaginal discharge and vaginal pain.   Musculoskeletal:  Negative for arthralgias, back pain and myalgias.   Skin:  Negative for color change and rash.   Neurological:  Negative for dizziness, syncope, light-headedness, numbness and headaches.   Hematological:  Negative for adenopathy. Does not bruise/bleed easily.   Psychiatric/Behavioral:  Negative for dysphoric mood and sleep disturbance. The patient is not nervous/anxious.        Objective   Physical Exam  OBGyn Exam     Objective      /76 (BP Location: Right arm, Patient Position: Sitting)   Ht 5' 5.5\" (1.664 m)   Wt 87.3 kg (192 lb 6.4 oz)   LMP 09/27/2024   BMI 31.53 kg/m²     General:   alert and oriented, in no acute distress   Neck: normal to inspection and palpation   Breast: normal appearance, no masses or tenderness   Heart:    Lungs:    Abdomen: soft, non-tender, without masses or organomegaly   Vulva: normal   Vagina: Scant amount of menstrual blood, without erythema or lesions.   Cervix: Scant amount of menstrual blood, without lesions or cervicitis.  No CMT   Uterus: top normal size, anteverted, non-tender   Adnexa: no mass, fullness, tenderness   Rectum: negative    Psych:  Normal mood and affect   Skin:  Without obvious lesions   Eyes: symmetric, with normal movements and reactivity   Musculoskeletal:  Normal muscle tone and movements appreciated       "

## 2025-03-12 ENCOUNTER — HOSPITAL ENCOUNTER (OUTPATIENT)
Dept: RADIOLOGY | Age: 45
Discharge: HOME | End: 2025-03-12
Payer: COMMERCIAL

## 2025-03-12 ENCOUNTER — RESULTS FOLLOW-UP (OUTPATIENT)
Dept: GYNECOLOGY | Facility: CLINIC | Age: 45
End: 2025-03-12

## 2025-03-12 VITALS — WEIGHT: 190 LBS | BODY MASS INDEX: 30.53 KG/M2 | HEIGHT: 66 IN

## 2025-03-12 DIAGNOSIS — Z12.31 ENCOUNTER FOR SCREENING MAMMOGRAM FOR BREAST CANCER: ICD-10-CM

## 2025-03-12 DIAGNOSIS — R92.8 ABNORMAL MAMMOGRAM: Primary | ICD-10-CM

## 2025-03-12 PROCEDURE — 77067 SCR MAMMO BI INCL CAD: CPT

## 2025-03-12 PROCEDURE — 77063 BREAST TOMOSYNTHESIS BI: CPT

## 2025-03-12 NOTE — RESULT ENCOUNTER NOTE
Left breast asymmetry noted on  screening mammogram.  Left breast ultrasound and left breast 3D diagnostic mammogram ordered as recommended by radiology.

## 2025-04-25 ENCOUNTER — OFFICE VISIT (OUTPATIENT)
Dept: GYNECOLOGY | Facility: CLINIC | Age: 45
End: 2025-04-25
Payer: COMMERCIAL

## 2025-04-25 VITALS — SYSTOLIC BLOOD PRESSURE: 110 MMHG | DIASTOLIC BLOOD PRESSURE: 66 MMHG | WEIGHT: 192.8 LBS | BODY MASS INDEX: 31.6 KG/M2

## 2025-04-25 DIAGNOSIS — D50.9 IRON DEFICIENCY ANEMIA, UNSPECIFIED IRON DEFICIENCY ANEMIA TYPE: ICD-10-CM

## 2025-04-25 DIAGNOSIS — N92.0 MENORRHAGIA WITH REGULAR CYCLE: Primary | ICD-10-CM

## 2025-04-25 DIAGNOSIS — D56.3 BETA THALASSEMIA TRAIT: ICD-10-CM

## 2025-04-25 DIAGNOSIS — N94.6 DYSMENORRHEA: ICD-10-CM

## 2025-04-25 PROCEDURE — 99214 OFFICE O/P EST MOD 30 MIN: CPT | Performed by: OBSTETRICS & GYNECOLOGY

## 2025-04-25 NOTE — Clinical Note
Patient needs sonohysterogram/endometrial biopsy in the near future.  Had episode of heavy bleeding with hemoglobin down to 5.5.

## 2025-04-25 NOTE — PROGRESS NOTES
Assessment & Plan   Diagnoses and all orders for this visit:    Menorrhagia with regular cycle    Iron deficiency anemia, unspecified iron deficiency anemia type    Beta thalassemia trait    Dysmenorrhea    1. menorrhagia-has a 2-year history of increasing worse periods.  Her menses are monthly intervals lasting about a week or so.  She will have 3 extremely heavy days requiring thick pad changing pad every 1 hour.  Her last menses started on 3/31/2025.  She had light bleeding for 1 week and then began having extremely heavy bleeding on 4/8/25 and 4/9/25 with cramps and large blood clots which were long and stringy and streaming down her legs.  She was in [place] at the time and could not seek care.  Her heart rate monitor was in the 130s.  She came back to the area and saw her primary doctor.  Had hemoglobin of 5.4 with repeat 5.5.  She was called by the doctor to go to the hospital and received blood transfusion x 2.  Her bleeding has since stopped.  Would recommend evaluation for this.  She had TSH normal, hCG negative.  Hemoglobin is as above.  -- Follow-up for sonohysterogram with endometrial biopsy.  Patient was counseled in detail about the findings.  Recommend ibuprofen or naproxen prior to it  -- She has hematology consult set up, highly agree with this.  -- Continue with oral iron once daily as recommended  -- Discussed TXA for heavy bleeding, patient declines  -- Discussed progesterone treatment, patient declines.  -- Depending on findings at sonohysterogram/endometrial biopsy can consider endometrial ablation, Mirena IUD, and even hysterectomy.  Given her low hemoglobin, hysterectomy would not be recommended at this time unless she had significant improvement of her blood count.  She was given information on Mirena IUD and endometrial ablation and encouraged to consider that.  2.  Dysmenorrhea-recommend naproxen or ibuprofen with this symptom.  3.  History of beta thalassemia-did have blood count for  life insurance last year and hemoglobin was 10+.  CBC from 2015 with hemoglobin of 10.3.  To follow-up with hematology  4. history of  x 3/tubal ligation.  Last  was twin delivery  5. dense breast/breast asymmetry-most recent mammogram 3/12/2025 with left asymmetry.  Left diagnostic mammogram and ultrasound recommended and order was previously placed.  She is scheduled for this in the near future.  6. history of perimenopause symptoms  7.  Family history of colon cancer-paternal aunt with colon cancer with metastasis.  Follow-up near future for sonohysterogram with endometrial biopsy or as needed.        Subjective   Patient ID: Butch Tripp is a 44 y.o. female.    Vitals:    25 1508   BP: 110/66     Patient was seen today for follow-up visit.  Please see assessment plan for details.        The following portions of the patient's history were reviewed and updated as appropriate: allergies, current medications, past family history, past medical history, past social history, past surgical history, and problem list.  Past Medical History:   Diagnosis Date    Anemia 11/3/2015    Shingles 2023     History reviewed. No pertinent surgical history.  OB History    Para Term  AB Living   5 3 2 1 2 4   SAB IAB Ectopic Multiple Live Births   2   1 4      # Outcome Date GA Lbr Kd/2nd Weight Sex Type Anes PTL Lv   5   36w0d    CS-Unspec   SARAH   4 Term      CS-Unspec   SARAH      Complications: Failed Induction   3 Term      CS-Unspec   SARAH   2 SAB            1 SAB                Current Outpatient Medications:     valACYclovir (VALTREX) 1,000 mg tablet, Take 1 tablet (1,000 mg total) by mouth 3 (three) times a day for 7 days, Disp: 21 tablet, Rfl: 1  No Known Allergies  Social History     Socioeconomic History    Marital status: /Civil Union     Spouse name: None    Number of children: 4    Years of education: None    Highest education level: None    Occupational History    None   Tobacco Use    Smoking status: Never    Smokeless tobacco: Never   Vaping Use    Vaping status: Never Used   Substance and Sexual Activity    Alcohol use: Yes     Comment: socially    Drug use: Never    Sexual activity: Yes     Partners: Male   Other Topics Concern    None   Social History Narrative    None     Social Drivers of Health     Financial Resource Strain: Not At Risk (4/15/2025)    Received from [facility]    Financial Insecurity     In the last 12 months did you skip medications to save money?: No     In the last 12 months was there a time when you needed to see a doctor but could not because of cost?: No   Food Insecurity: No Food Insecurity (4/15/2025)    Received from [facility]    Food Insecurity     In the last 12 months did you ever eat less than you felt you should because there wasn't enough money for food?: No   Transportation Needs: No Transportation Needs (4/15/2025)    Received from [facility]    Transportation Needs     In the last 12 months have you ever had to go without healthcare because you didn't have a way to get there?: No   Physical Activity: Sufficiently Active (3/6/2019)    Exercise Vital Sign     Days of Exercise per Week: 4 days     Minutes of Exercise per Session: 40 min   Stress: No Stress Concern Present (3/6/2019)    [place] [place] [employer] - Occupational Stress Questionnaire     Feeling of Stress : Not at all   Social Connections: Socially Integrated (4/15/2025)    Received from [facility]    Social Connection     Do you often feel lonely?: No   Intimate Partner Violence: Not At Risk (3/6/2019)    Humiliation, Afraid, Rape, and Kick questionnaire     Fear of Current or Ex-Partner: No     Emotionally Abused: No     Physically Abused: No     Sexually Abused: No   Housing Stability: Not At Risk (4/15/2025)    Received from [facility]     Housing Stability     Are you worried that in the next 2 months you may not have stable housing?: No     Family History   Problem Relation Age of Onset    Osteoporosis Mother     Thyroid disease Mother     Heart disease Father     Hypertension Father     Diabetes Father     No Known Problems Sister     No Known Problems Daughter     No Known Problems Daughter     Hypertension Maternal Grandmother     Hypertension Maternal Grandfather     Heart disease Paternal Grandmother     Hypertension Paternal Grandmother     Heart disease Paternal Grandfather     Hypertension Paternal Grandfather     Thyroid disease Brother     No Known Problems Maternal Aunt     No Known Problems Maternal Aunt     No Known Problems Maternal Aunt     No Known Problems Paternal Aunt     No Known Problems Paternal Aunt     No Known Problems Paternal Aunt        Review of Systems   Constitutional:  Negative for chills, diaphoresis, fatigue and fever.   Respiratory:  Negative for apnea, cough, chest tightness, shortness of breath and wheezing.    Cardiovascular:  Negative for chest pain, palpitations and leg swelling.   Gastrointestinal:  Negative for abdominal distention, abdominal pain, anal bleeding, constipation, diarrhea, nausea, rectal pain and vomiting.   Genitourinary:  Positive for menstrual problem. Negative for difficulty urinating, dyspareunia, dysuria, frequency, hematuria, pelvic pain, urgency, vaginal bleeding, vaginal discharge and vaginal pain.   Musculoskeletal:  Negative for arthralgias, back pain and myalgias.   Skin:  Negative for color change and rash.   Neurological:  Negative for dizziness, syncope, light-headedness, numbness and headaches.   Hematological:  Negative for adenopathy. Does not bruise/bleed easily.   Psychiatric/Behavioral:  Negative for dysphoric mood and sleep disturbance. The patient is not nervous/anxious.        Objective   Physical Exam  OBGyn Exam     Objective      /66 (BP Location: Left arm,  Patient Position: Sitting)   Wt 87.5 kg (192 lb 12.8 oz)   LMP 03/31/2025   BMI 31.60 kg/m²     General:   alert and oriented, in no acute distress   Neck:    Breast:    Heart:    Lungs:    Abdomen: soft, non-tender, without masses or organomegaly   Vulva: normal   Vagina: Without erythema or lesions or discharge.  Normal   Cervix: Without lesions or discharge or cervicitis.  No Cervical motion tenderness   Uterus: top normal size, non-tender, size consistent with top normal 6 weeks   Adnexa: no mass, fullness, tenderness   Rectum:     Psych:  Normal mood and affect   Skin:  Without obvious lesions   Eyes: symmetric, with normal movements and reactivity   Musculoskeletal:  Normal muscle tone and movements appreciated

## 2025-04-28 ENCOUNTER — TELEPHONE (OUTPATIENT)
Dept: OBGYN CLINIC | Facility: CLINIC | Age: 45
End: 2025-04-28

## 2025-04-28 NOTE — TELEPHONE ENCOUNTER
Patient is scheduled for May 1 at 330pm. She is respond to [employer] message sent if she can't make it. Otherwise, she will need transfer to office.     ----- Message from Chris Prakash MD sent at 4/25/2025  3:49 PM EDT -----  Patient needs sonohysterogram/endometrial biopsy in the near future.  Had episode of heavy bleeding with hemoglobin down to 5.5.

## 2025-04-29 ENCOUNTER — HOSPITAL ENCOUNTER (OUTPATIENT)
Dept: MAMMOGRAPHY | Facility: CLINIC | Age: 45
Discharge: HOME | End: 2025-04-29
Attending: OBSTETRICS & GYNECOLOGY
Payer: COMMERCIAL

## 2025-04-29 ENCOUNTER — HOSPITAL ENCOUNTER (OUTPATIENT)
Dept: ULTRASOUND IMAGING | Facility: CLINIC | Age: 45
Discharge: HOME | End: 2025-04-29
Attending: OBSTETRICS & GYNECOLOGY
Payer: COMMERCIAL

## 2025-04-29 VITALS — HEIGHT: 66 IN | BODY MASS INDEX: 30.86 KG/M2 | WEIGHT: 192 LBS

## 2025-04-29 DIAGNOSIS — R92.8 ABNORMAL MAMMOGRAM: Primary | ICD-10-CM

## 2025-04-29 DIAGNOSIS — R92.8 ABNORMAL MAMMOGRAM: ICD-10-CM

## 2025-04-29 PROCEDURE — G0279 TOMOSYNTHESIS, MAMMO: HCPCS

## 2025-04-29 PROCEDURE — 77065 DX MAMMO INCL CAD UNI: CPT

## 2025-04-29 PROCEDURE — 76642 ULTRASOUND BREAST LIMITED: CPT

## 2025-04-29 NOTE — PROGRESS NOTES
6-month left diagnostic mammogram 3D and left breast ultrasound ordered in follow-up to abnormal mammogram, postdated 10/1/2025.

## 2025-04-30 NOTE — PROGRESS NOTES
AMB US Pelvic Non OB    Date/Time: 5/1/2025 3:30 PM    Performed by: Clotilde Johnson  Authorized by: Chris Prakash MD  Universal Protocol:  Consent: Verbal consent obtained.  Consent given by: patient  Timeout called at: 5/1/2025 3:20 PM.  Patient understanding: patient states understanding of the procedure being performed  Patient identity confirmed: verbally with patient    Procedure details:     SIS Procedure: Yes    Indications: non-obstetric vaginal bleeding      Technique:  Transvaginal US, Non-OB    Position: lithotomy exam    Uterine findings:     Length (cm): 9.33    Height (cm):  7.92    Width (cm):  7.46    Endometrial stripe: identified      Endometrium thickness (mm):  15.9  Left ovary findings:     Left ovary:  Visualized    Length (cm): 3.94    Height (cm): 2.33    Width (cm): 2.68  Right ovary findings:     Right ovary:  Visualized    Length (cm): 4.44    Height (cm): 2.49    Width (cm): 2.4  Other findings:     Free pelvic fluid: not identified      Free peritoneal fluid: not identified    Post-Procedure Details:     Impression:  Retroverted uterus demonstrates a posterior fibroid, 6.1cm. The bilateral ovaries appear within normal limits. No free fluid.     Tolerance:  Tolerated well, no immediate complications    Complications: no complications    Additional Procedure Comments:      [employer] F8 E8C-RS transvaginal transducer Serial # [id number] was used to perform the examination today and subsequently followed with high level disinfection utilizing Trophon EPR procedure.     Ultrasound performed at:     [facility]  [address] [place] [address]  Phone:  [phone]  Fax:  [phone]    Sonohysterogram    Date/Time: 5/1/2025 3:30 PM    Performed by: Chris Prakash MD  Authorized by: Chris Prakash MD  Universal Protocol:  Consent: Verbal consent obtained.  Consent given by: patient  Timeout called at: 5/1/2025 3:20 PM.  Patient understanding: patient states understanding of the  procedure being performed  Patient identity confirmed: verbally with patient    Pre-procedure:     Prepped with: Hibiclens    Procedure:     Cervix cleaned and prepped: yes      Tenaculum applied to cervix: yes      Uterus sounded: yes      Catheter inserted: yes      Uterine cavity distended with saline: yes    Post-procedure:     Patient observed: yes      Post procedure instructions given to patient: yes      Patient tolerated procedure well with no complications: yes    Comments:      Sonohysterogram demonstrates a large fibroid protruding into the upper cervical canal and endometrial canal. The fibroid extends beyond the outer layer of the myometrium. It is unclear if the fibroids noted are part of a singular fibroid or several contiguous fibroids.   Endometrial biopsy    Date/Time: 5/1/2025 3:30 PM    Performed by: Chris Prakash MD  Authorized by: Chris Prakash MD  Universal Protocol:  Consent: Verbal consent obtained.  Consent given by: patient  Timeout called at: 5/1/2025 3:20 PM.  Patient understanding: patient states understanding of the procedure being performed  Patient identity confirmed: verbally with patient    Indication:     Indications: Other disorder of menstruation and other abnormal bleeding from female genital tract    Procedure:     Procedure: endometrial biopsy with Pipelle      A bivalve speculum was placed in the vagina: yes      Cervix cleaned and prepped: yes      Specimen collected: specimen collected and sent to pathology      Patient tolerated procedure well with no complications: yes

## 2025-05-01 ENCOUNTER — PROCEDURE VISIT (OUTPATIENT)
Dept: GYNECOLOGY | Facility: CLINIC | Age: 45
End: 2025-05-01
Payer: COMMERCIAL

## 2025-05-01 ENCOUNTER — ULTRASOUND (OUTPATIENT)
Dept: GYNECOLOGY | Facility: CLINIC | Age: 45
End: 2025-05-01
Payer: COMMERCIAL

## 2025-05-01 DIAGNOSIS — N92.0 MENORRHAGIA WITH REGULAR CYCLE: Primary | ICD-10-CM

## 2025-05-01 DIAGNOSIS — N93.9 ABNORMAL UTERINE BLEEDING (AUB): Primary | ICD-10-CM

## 2025-05-01 DIAGNOSIS — D50.9 IRON DEFICIENCY ANEMIA, UNSPECIFIED IRON DEFICIENCY ANEMIA TYPE: ICD-10-CM

## 2025-05-01 DIAGNOSIS — N94.6 DYSMENORRHEA: ICD-10-CM

## 2025-05-01 DIAGNOSIS — D21.9 FIBROIDS: ICD-10-CM

## 2025-05-01 PROCEDURE — 99214 OFFICE O/P EST MOD 30 MIN: CPT | Performed by: OBSTETRICS & GYNECOLOGY

## 2025-05-01 PROCEDURE — 76831 ECHO EXAM UTERUS: CPT | Performed by: OBSTETRICS & GYNECOLOGY

## 2025-05-01 PROCEDURE — 58100 BIOPSY OF UTERUS LINING: CPT | Performed by: OBSTETRICS & GYNECOLOGY

## 2025-05-01 PROCEDURE — 88305 TISSUE EXAM BY PATHOLOGIST: CPT | Performed by: STUDENT IN AN ORGANIZED HEALTH CARE EDUCATION/TRAINING PROGRAM

## 2025-05-01 PROCEDURE — 58340 CATHETER FOR HYSTEROGRAPHY: CPT | Performed by: OBSTETRICS & GYNECOLOGY

## 2025-05-01 RX ORDER — TRANEXAMIC ACID 650 MG/1
1300 TABLET ORAL 3 TIMES DAILY PRN
Qty: 30 TABLET | Refills: 1 | Status: SHIPPED | OUTPATIENT
Start: 2025-05-01

## 2025-05-01 NOTE — PROGRESS NOTES
Assessment & Plan   Diagnoses and all orders for this visit:    Menorrhagia with regular cycle  -     Tranexamic Acid 650 MG TABS; Take 2 tablets (1,300 mg total) by mouth 3 (three) times a day as needed (Heavy menses) Up to 5 days continuously.    Dysmenorrhea    Iron deficiency anemia, unspecified iron deficiency anemia type    Fibroids    1.  Uterine fibroid-noted on ultrasound today, 6.1 cm, posterior.  To extend from the subserosal to the submucosal area, deviating the endometrial cavity.  Sonohysterogram today was without any other focal findings appreciated.  Endometrial biopsy was done and we will inform the patient of the findings.  Given the fibroid location and extension from subserous to submucous, resection of fibroid is not feasible.  We had discussed previously about hysteroscopy with D&C and endometrial ablation or possible Mirena IUD.  These could be possible depending on the extension into the endometrium which would likely need to be evaluated at hysteroscopy.  She will consider this.  We will discuss at follow-up visit in 2 to 3 weeks time.  Additionally, given the magnitude of her bleeding and the low hemoglobin, she needs to consider hysterectomy as well.  She may consider that and she will research it over the next few weeks and we will discuss at follow-up visit.  She had numerous questions about fibroids and they were answered in detail.  Likelihood of any serious problem such as precancerous changes quite low generally in the order of 1/[id number].  2. menorrhagia-please see prior note for details.  Generally, she will have monthly menses with the first 3 days light and then have extremely heavy bleeding for 1 to 2 days time.  It is a point where she cannot leave the house, changes pad and tampon simultaneously at 30-minute intervals.  Likely, this was contributing to her anemia which then was worsened by her extremely heavy bleeding episode 4/8 and 4/9/2025.  TXA prescription was sent  today.  I thought I had sent it on last visit but it is not documented in the chart.  She was again counseled about this.  Suggested she consider picking this up today and have it ready in case her bleeding gets worsened.  She started bleeding yesterday and has light to moderate bleeding today.  Should she have episode of significant hemorrhage particularly associated with lightheadedness or dizziness, strongly encouraged to seek emergency care.  Options for care were reviewed as above.  3. dysmenorrhea-naproxen or ibuprofen recommended  4. anemia-hemoglobin down to 5.4-5.5.  Status post transfusion x 2.  Normally, her hemoglobin lives around 10-11 with beta thalassemia that she has.  She has follow-up with primary doctor tomorrow.  She has follow-up with hematology next week.  She will see them and then return to see me and we will proceed accordingly.  5. beta thalassemia-as above  6. history of  x 3/tubal ligation-last delivery was twin delivery  7.  Perimenopause symptoms-to follow-up  8. breast-had left breast asymmetry with left diagnostic mammogram and left breast ultrasound done with focal findings with 6-month follow-up recommended.  This was seen by me yesterday and left diagnostic mammogram and left breast ultrasound were ordered.  To follow-up 2 to 3 weeks with me or as needed.  Again, counseled to seek emergency care if heavy bleeding with any orthostatic symptoms.      Subjective   Patient ID: Butch Tripp is a 44 y.o. female.    There were no vitals filed for this visit.  Patient was seen today for follow-up visit with sonohysterogram/endometrial biopsy.  Please see assessment plan and procedure note for details.        The following portions of the patient's history were reviewed and updated as appropriate: allergies, current medications, past family history, past medical history, past social history, past surgical history, and problem list.  Past Medical History:   Diagnosis Date     Anemia 11/3/2015    Shingles 2023     No past surgical history on file.  OB History    Para Term  AB Living   5 3 2 1 2 4   SAB IAB Ectopic Multiple Live Births   2   1 4      # Outcome Date GA Lbr Kd/2nd Weight Sex Type Anes PTL Lv   5   36w0d    CS-Unspec   SARAH   4 Term      CS-Unspec   SARAH      Complications: Failed Induction   3 Term      CS-Unspec   SARAH   2 SAB            1 SAB                Current Outpatient Medications:     Tranexamic Acid 650 MG TABS, Take 2 tablets (1,300 mg total) by mouth 3 (three) times a day as needed (Heavy menses) Up to 5 days continuously., Disp: 30 tablet, Rfl: 1    valACYclovir (VALTREX) 1,000 mg tablet, Take 1 tablet (1,000 mg total) by mouth 3 (three) times a day for 7 days, Disp: 21 tablet, Rfl: 1  No Known Allergies  Social History     Socioeconomic History    Marital status: /Civil Union     Spouse name: Not on file    Number of children: 4    Years of education: Not on file    Highest education level: Not on file   Occupational History    Not on file   Tobacco Use    Smoking status: Never    Smokeless tobacco: Never   Vaping Use    Vaping status: Never Used   Substance and Sexual Activity    Alcohol use: Yes     Comment: socially    Drug use: Never    Sexual activity: Yes     Partners: Male   Other Topics Concern    Not on file   Social History Narrative    Not on file     Social Drivers of Health     Financial Resource Strain: Not At Risk (4/15/2025)    Received from [facility]    Financial Insecurity     In the last 12 months did you skip medications to save money?: No     In the last 12 months was there a time when you needed to see a doctor but could not because of cost?: No   Food Insecurity: No Food Insecurity (4/15/2025)    Received from [facility]    Food Insecurity     In the last 12 months did you ever eat less than you felt you should because there wasn't enough money for food?: No    Transportation Needs: No Transportation Needs (4/15/2025)    Received from [facility]    Transportation Needs     In the last 12 months have you ever had to go without healthcare because you didn't have a way to get there?: No   Physical Activity: Sufficiently Active (3/6/2019)    Exercise Vital Sign     Days of Exercise per Week: 4 days     Minutes of Exercise per Session: 40 min   Stress: No Stress Concern Present (3/6/2019)    [employer] [place] [employer] - Occupational Stress Questionnaire     Feeling of Stress : Not at all   Social Connections: Socially Integrated (4/15/2025)    Received from [facility]    Social Connection     Do you often feel lonely?: No   Intimate Partner Violence: Not At Risk (3/6/2019)    Humiliation, Afraid, Rape, and Kick questionnaire     Fear of Current or Ex-Partner: No     Emotionally Abused: No     Physically Abused: No     Sexually Abused: No   Housing Stability: Not At Risk (4/15/2025)    Received from [facility]    Housing Stability     Are you worried that in the next 2 months you may not have stable housing?: No     Family History   Problem Relation Age of Onset    Osteoporosis Mother     Thyroid disease Mother     Heart disease Father     Hypertension Father     Diabetes Father     No Known Problems Sister     No Known Problems Daughter     No Known Problems Daughter     Hypertension Maternal Grandmother     Hypertension Maternal Grandfather     Heart disease Paternal Grandmother     Hypertension Paternal Grandmother     Heart disease Paternal Grandfather     Hypertension Paternal Grandfather     Thyroid disease Brother     No Known Problems Maternal Aunt     No Known Problems Maternal Aunt     No Known Problems Maternal Aunt     No Known Problems Paternal Aunt     No Known Problems Paternal Aunt     No Known Problems Paternal Aunt        Review of Systems   Constitutional:  Negative for chills, diaphoresis,  fatigue and fever.   Respiratory:  Negative for apnea, cough, chest tightness, shortness of breath and wheezing.    Cardiovascular:  Negative for chest pain, palpitations and leg swelling.   Gastrointestinal:  Negative for abdominal distention, abdominal pain, anal bleeding, constipation, diarrhea, nausea, rectal pain and vomiting.   Genitourinary:  Positive for menstrual problem. Negative for difficulty urinating, dyspareunia, dysuria, frequency, hematuria, pelvic pain, urgency, vaginal bleeding, vaginal discharge and vaginal pain.   Musculoskeletal:  Negative for arthralgias, back pain and myalgias.   Skin:  Negative for color change and rash.   Neurological:  Negative for dizziness, syncope, light-headedness, numbness and headaches.   Hematological:  Negative for adenopathy. Does not bruise/bleed easily.   Psychiatric/Behavioral:  Negative for dysphoric mood and sleep disturbance. The patient is not nervous/anxious.        Objective   Physical Exam  OBGyn Exam     Objective      LMP 03/31/2025     General:   alert and oriented, in no acute distress   Neck:    Breast:    Heart:    Lungs:    Abdomen: soft, non-tender, without masses or organomegaly   Vulva: normal   Vagina: Moderate amount of menstrual blood, without erythema or lesions or discharge.   Cervix: Amount of menstrual blood, without lesions or cervicitis.  No CMT.   Uterus: mid-position, non-tender, size consistent with 8-10 weeks   Adnexa: no mass, fullness, tenderness   Rectum: deferred    Psych:  Normal mood and affect   Skin:  Without obvious lesions   Eyes: symmetric, with normal movements and reactivity   Musculoskeletal:  Normal muscle tone and movements appreciated

## 2025-05-07 PROCEDURE — 88305 TISSUE EXAM BY PATHOLOGIST: CPT | Performed by: STUDENT IN AN ORGANIZED HEALTH CARE EDUCATION/TRAINING PROGRAM

## 2025-05-08 ENCOUNTER — RESULTS FOLLOW-UP (OUTPATIENT)
Dept: GYNECOLOGY | Facility: CLINIC | Age: 45
End: 2025-05-08

## 2025-05-16 ENCOUNTER — OFFICE VISIT (OUTPATIENT)
Dept: GYNECOLOGY | Facility: CLINIC | Age: 45
End: 2025-05-16
Payer: COMMERCIAL

## 2025-05-16 VITALS — BODY MASS INDEX: 31.27 KG/M2 | SYSTOLIC BLOOD PRESSURE: 114 MMHG | DIASTOLIC BLOOD PRESSURE: 66 MMHG | WEIGHT: 190.8 LBS

## 2025-05-16 DIAGNOSIS — N94.6 DYSMENORRHEA: ICD-10-CM

## 2025-05-16 DIAGNOSIS — N92.0 MENORRHAGIA WITH REGULAR CYCLE: Primary | ICD-10-CM

## 2025-05-16 DIAGNOSIS — D50.9 IRON DEFICIENCY ANEMIA, UNSPECIFIED IRON DEFICIENCY ANEMIA TYPE: ICD-10-CM

## 2025-05-16 DIAGNOSIS — D21.9 FIBROIDS: ICD-10-CM

## 2025-05-16 PROCEDURE — 99213 OFFICE O/P EST LOW 20 MIN: CPT | Performed by: OBSTETRICS & GYNECOLOGY

## 2025-05-16 NOTE — PROGRESS NOTES
Assessment & Plan   Diagnoses and all orders for this visit:    Menorrhagia with regular cycle    Dysmenorrhea    Fibroids    Iron deficiency anemia, unspecified iron deficiency anemia type    1. fibroid-ultrasound previously with posterior myoma extending from subserous to submucosal area.  She was counseled about this previously.  To follow clinically at this time.  2.  Menorrhagia-prior note for details.  Had episode of flooding on -.  Hemoglobin was down to 5.4/5.5.  She was prescribed TXA at last visit and it worked fantastically, bleeding was cut in half with very small clots.  She is very happy about this.  She will continue with this.  She will call return with recurrence of menorrhagia  3.  Anemia-took oral iron but developed a rash.  Is following up with hematology.  Was prescribed iron transfusions weekly for the next 6 to 8 weeks time, first 1 is later today.  She will follow-up with hematologist next month as scheduled and then we will proceed accordingly.  She has CBC ordered and she will do that next month as well.  4. dysmenorrhea-recommend ibuprofen as taking  5. beta [id number] as per hematology  6. history of  x 3/tubal ligation  7.  Perimenopause symptoms  8.  Breast-left breast asymmetry noted previously with left diagnostic mammogram and left breast ultrasound 6-month follow-up recommended.  These were ordered and she was encouraged to get this done.  Follow-up 2025 is scheduled for yearly exam or as needed.    Subjective   Patient ID: Butch Tripp is a 44 y.o. female.    Vitals:    25 0928   BP: 114/66     Patient was seen today for follow-up visit.  Please see assessment plan for details.      The following portions of the patient's history were reviewed and updated as appropriate: allergies, current medications, past family history, past medical history, past social history, past surgical history, and problem list.  Past Medical History:   Diagnosis  Date    Anemia 11/3/2015    Shingles 2023     History reviewed. No pertinent surgical history.  OB History    Para Term  AB Living   5 3 2 1 2 4   SAB IAB Ectopic Multiple Live Births   2   1 4      # Outcome Date GA Lbr Kd/2nd Weight Sex Type Anes PTL Lv   5   36w0d    CS-Unspec   SARAH   4 Term      CS-Unspec   SARAH      Complications: Failed Induction   3 Term      CS-Unspec   SARAH   2 SAB            1 SAB              Current Medications[1]  Allergies[2]  Social History     Socioeconomic History    Marital status: /Civil Union     Spouse name: None    Number of children: 4    Years of education: None    Highest education level: None   Occupational History    None   Tobacco Use    Smoking status: Never    Smokeless tobacco: Never   Vaping Use    Vaping status: Never Used   Substance and Sexual Activity    Alcohol use: Yes     Comment: socially    Drug use: Never    Sexual activity: Yes     Partners: Male   Other Topics Concern    None   Social History Narrative    None     Social Drivers of Health     Financial Resource Strain: Not At Risk (4/15/2025)    Received from [facility]    Financial Insecurity     In the last 12 months did you skip medications to save money?: No     In the last 12 months was there a time when you needed to see a doctor but could not because of cost?: No   Food Insecurity: No Food Insecurity (4/15/2025)    Received from [facility]    Food Insecurity     In the last 12 months did you ever eat less than you felt you should because there wasn't enough money for food?: No   Transportation Needs: No Transportation Needs (4/15/2025)    Received from [facility]    Transportation Needs     In the last 12 months have you ever had to go without healthcare because you didn't have a way to get there?: No   Physical Activity: Sufficiently Active (3/6/2019)    Exercise Vital Sign     Days of Exercise per  Week: 4 days     Minutes of Exercise per Session: 40 min   Stress: No Stress Concern Present (3/6/2019)    [employer] [place] [employer] - Occupational Stress Questionnaire     Feeling of Stress : Not at all   Social Connections: Socially Integrated (4/15/2025)    Received from [facility]    Social Connection     Do you often feel lonely?: No   Intimate Partner Violence: Not At Risk (3/6/2019)    Humiliation, Afraid, Rape, and Kick questionnaire     Fear of Current or Ex-Partner: No     Emotionally Abused: No     Physically Abused: No     Sexually Abused: No   Housing Stability: Not At Risk (4/15/2025)    Received from [facility]    Housing Stability     Are you worried that in the next 2 months you may not have stable housing?: No     Family History   Problem Relation Age of Onset    Osteoporosis Mother     Thyroid disease Mother     Heart disease Father     Hypertension Father     Diabetes Father     No Known Problems Sister     No Known Problems Daughter     No Known Problems Daughter     Hypertension Maternal Grandmother     Hypertension Maternal Grandfather     Heart disease Paternal Grandmother     Hypertension Paternal Grandmother     Heart disease Paternal Grandfather     Hypertension Paternal Grandfather     Thyroid disease Brother     No Known Problems Maternal Aunt     No Known Problems Maternal Aunt     No Known Problems Maternal Aunt     No Known Problems Paternal Aunt     No Known Problems Paternal Aunt     No Known Problems Paternal Aunt        Review of Systems   Constitutional:  Negative for chills, diaphoresis, fatigue and fever.   Respiratory:  Negative for apnea, cough, chest tightness, shortness of breath and wheezing.    Cardiovascular:  Negative for chest pain, palpitations and leg swelling.   Gastrointestinal:  Negative for abdominal distention, abdominal pain, anal bleeding, constipation, diarrhea, nausea, rectal pain and vomiting.    Genitourinary:  Positive for menstrual problem. Negative for difficulty urinating, dyspareunia, dysuria, frequency, hematuria, pelvic pain, urgency, vaginal bleeding, vaginal discharge and vaginal pain.   Musculoskeletal:  Negative for arthralgias, back pain and myalgias.   Skin:  Negative for color change and rash.   Neurological:  Negative for dizziness, syncope, light-headedness, numbness and headaches.   Hematological:  Negative for adenopathy. Does not bruise/bleed easily.   Psychiatric/Behavioral:  Negative for dysphoric mood and sleep disturbance. The patient is not nervous/anxious.        Objective   Physical Exam  OBGyn Exam     Objective      /66 (BP Location: Left arm, Patient Position: Sitting)   Wt 86.5 kg (190 lb 12.8 oz)   LMP 04/28/2025   BMI 31.27 kg/m²     General:   alert and oriented, in no acute distress   Neck:    Breast:    Heart:    Lungs:    Abdomen: soft, non-tender, without masses or organomegaly   Vulva:    Vagina:    Cervix:    Uterus:    Adnexa:    Rectum:     Psych:  Normal mood and affect   Skin:  Without obvious lesions   Eyes: symmetric, with normal movements and reactivity   Musculoskeletal:  Normal muscle tone and movements appreciated            [1]   Current Outpatient Medications:     Tranexamic Acid 650 MG TABS, Take 2 tablets (1,300 mg total) by mouth 3 (three) times a day as needed (Heavy menses) Up to 5 days continuously., Disp: 30 tablet, Rfl: 1    valACYclovir (VALTREX) 1,000 mg tablet, Take 1 tablet (1,000 mg total) by mouth 3 (three) times a day for 7 days, Disp: 21 tablet, Rfl: 1  [2] No Known Allergies